# Patient Record
Sex: FEMALE | NOT HISPANIC OR LATINO | Employment: FULL TIME | URBAN - METROPOLITAN AREA
[De-identification: names, ages, dates, MRNs, and addresses within clinical notes are randomized per-mention and may not be internally consistent; named-entity substitution may affect disease eponyms.]

---

## 2019-04-28 ENCOUNTER — HOSPITAL ENCOUNTER (EMERGENCY)
Facility: CLINIC | Age: 24
Discharge: HOME | End: 2019-04-28
Attending: EMERGENCY MEDICINE | Admitting: EMERGENCY MEDICINE

## 2019-04-28 ENCOUNTER — APPOINTMENT (OUTPATIENT)
Dept: ULTRASOUND IMAGING | Facility: CLINIC | Age: 24
End: 2019-04-28
Attending: EMERGENCY MEDICINE

## 2019-04-28 VITALS
WEIGHT: 150 LBS | SYSTOLIC BLOOD PRESSURE: 116 MMHG | HEIGHT: 69 IN | BODY MASS INDEX: 22.22 KG/M2 | TEMPERATURE: 98.7 F | RESPIRATION RATE: 22 BRPM | OXYGEN SATURATION: 100 % | HEART RATE: 76 BPM | DIASTOLIC BLOOD PRESSURE: 78 MMHG

## 2019-04-28 DIAGNOSIS — F43.0 STRESS RESPONSE: ICD-10-CM

## 2019-04-28 DIAGNOSIS — F41.0 ANXIETY ATTACK: ICD-10-CM

## 2019-04-28 LAB
ABO + RH BLD: NORMAL
ABO + RH BLD: NORMAL
ANION GAP SERPL CALCULATED.3IONS-SCNC: 15 MMOL/L (ref 3–14)
B-HCG SERPL-ACNC: <1 IU/L (ref 0–5)
BASOPHILS # BLD AUTO: 0 10E9/L (ref 0–0.2)
BASOPHILS NFR BLD AUTO: 0.4 %
BUN SERPL-MCNC: 24 MG/DL (ref 7–30)
CALCIUM SERPL-MCNC: 9.5 MG/DL (ref 8.5–10.1)
CHLORIDE SERPL-SCNC: 106 MMOL/L (ref 94–109)
CO2 SERPL-SCNC: 18 MMOL/L (ref 20–32)
CREAT SERPL-MCNC: 0.79 MG/DL (ref 0.52–1.04)
DIFFERENTIAL METHOD BLD: NORMAL
EOSINOPHIL # BLD AUTO: 0.1 10E9/L (ref 0–0.7)
EOSINOPHIL NFR BLD AUTO: 0.5 %
ERYTHROCYTE [DISTWIDTH] IN BLOOD BY AUTOMATED COUNT: 12.8 % (ref 10–15)
GFR SERPL CREATININE-BSD FRML MDRD: >90 ML/MIN/{1.73_M2}
GLUCOSE SERPL-MCNC: 97 MG/DL (ref 70–99)
HCT VFR BLD AUTO: 40.7 % (ref 35–47)
HGB BLD-MCNC: 13.8 G/DL (ref 11.7–15.7)
IMM GRANULOCYTES # BLD: 0 10E9/L (ref 0–0.4)
IMM GRANULOCYTES NFR BLD: 0.4 %
LYMPHOCYTES # BLD AUTO: 1.9 10E9/L (ref 0.8–5.3)
LYMPHOCYTES NFR BLD AUTO: 18.8 %
MCH RBC QN AUTO: 29.3 PG (ref 26.5–33)
MCHC RBC AUTO-ENTMCNC: 33.9 G/DL (ref 31.5–36.5)
MCV RBC AUTO: 86 FL (ref 78–100)
MONOCYTES # BLD AUTO: 0.6 10E9/L (ref 0–1.3)
MONOCYTES NFR BLD AUTO: 5.7 %
NEUTROPHILS # BLD AUTO: 7.5 10E9/L (ref 1.6–8.3)
NEUTROPHILS NFR BLD AUTO: 74.2 %
NRBC # BLD AUTO: 0 10*3/UL
NRBC BLD AUTO-RTO: 0 /100
PLATELET # BLD AUTO: 286 10E9/L (ref 150–450)
POTASSIUM SERPL-SCNC: 3.5 MMOL/L (ref 3.4–5.3)
RBC # BLD AUTO: 4.71 10E12/L (ref 3.8–5.2)
SODIUM SERPL-SCNC: 139 MMOL/L (ref 133–144)
SPECIMEN EXP DATE BLD: NORMAL
TROPONIN I SERPL-MCNC: <0.015 UG/L (ref 0–0.04)
WBC # BLD AUTO: 10.1 10E9/L (ref 4–11)

## 2019-04-28 PROCEDURE — 76801 OB US < 14 WKS SINGLE FETUS: CPT

## 2019-04-28 PROCEDURE — 80048 BASIC METABOLIC PNL TOTAL CA: CPT | Performed by: EMERGENCY MEDICINE

## 2019-04-28 PROCEDURE — 84702 CHORIONIC GONADOTROPIN TEST: CPT | Performed by: EMERGENCY MEDICINE

## 2019-04-28 PROCEDURE — 99284 EMERGENCY DEPT VISIT MOD MDM: CPT | Mod: 25

## 2019-04-28 PROCEDURE — 25000128 H RX IP 250 OP 636: Performed by: EMERGENCY MEDICINE

## 2019-04-28 PROCEDURE — 84484 ASSAY OF TROPONIN QUANT: CPT | Performed by: EMERGENCY MEDICINE

## 2019-04-28 PROCEDURE — 86901 BLOOD TYPING SEROLOGIC RH(D): CPT | Performed by: EMERGENCY MEDICINE

## 2019-04-28 PROCEDURE — 85025 COMPLETE CBC W/AUTO DIFF WBC: CPT | Performed by: EMERGENCY MEDICINE

## 2019-04-28 RX ORDER — LORAZEPAM 2 MG/ML
1 INJECTION INTRAMUSCULAR ONCE
Status: DISCONTINUED | OUTPATIENT
Start: 2019-04-28 | End: 2019-04-28 | Stop reason: CLARIF

## 2019-04-28 RX ORDER — ALPRAZOLAM 0.5 MG
0.5 TABLET ORAL 3 TIMES DAILY PRN
Qty: 8 TABLET | Refills: 0 | Status: SHIPPED | OUTPATIENT
Start: 2019-04-28 | End: 2024-06-18

## 2019-04-28 ASSESSMENT — MIFFLIN-ST. JEOR: SCORE: 1509.78

## 2019-04-28 NOTE — ED AVS SNAPSHOT
Emergency Department  [address] [place] [id number]  Phone:  [phone]  Fax:  [phone]                                    Pelon Guevara   MRN: [id number]    Department:   Emergency Department   Date of Visit:  4/28/2019           After Visit Summary Signature Page    I have received my discharge instructions, and my questions have been answered. I have discussed any challenges I see with this plan with the nurse or doctor.    ..........................................................................................................................................  Patient/Patient Representative Signature      ..........................................................................................................................................  Patient Representative Print Name and Relationship to Patient    ..................................................               ................................................  Date                                   Time    ..........................................................................................................................................  Reviewed by Signature/Title    ...................................................              ..............................................  Date                                               Time          22EPIC Rev 08/18

## 2019-04-29 NOTE — ED NOTES
Pt reports being approx. 8 weeks pregnant at this time. States that she noted some vaginal bleeding yesterday.
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
## 2019-04-29 NOTE — ED TRIAGE NOTES
Pt presents to the ED with c/o anxiety and panic attack. Per patient and EMS patient had a similar episode earlier this week. Pt breathing rapidly upon arrival, complains of some chest tightness.

## 2019-04-29 NOTE — ED NOTES
Bed: ED13  Expected date:   Expected time:   Means of arrival:   Comments:  Estefany 23/F Panic attack

## 2020-08-22 ENCOUNTER — NURSE TRIAGE (OUTPATIENT)
Dept: NURSING | Facility: CLINIC | Age: 25
End: 2020-08-22
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
## 2024-03-28 ENCOUNTER — APPOINTMENT (OUTPATIENT)
Dept: GENERAL RADIOLOGY | Facility: CLINIC | Age: 29
End: 2024-03-28
Attending: FAMILY MEDICINE

## 2024-03-28 ENCOUNTER — APPOINTMENT (OUTPATIENT)
Dept: CT IMAGING | Facility: CLINIC | Age: 29
End: 2024-03-28
Attending: FAMILY MEDICINE

## 2024-03-28 ENCOUNTER — HOSPITAL ENCOUNTER (EMERGENCY)
Facility: CLINIC | Age: 29
Discharge: HOME | End: 2024-03-28
Attending: FAMILY MEDICINE | Admitting: FAMILY MEDICINE

## 2024-03-28 VITALS
RESPIRATION RATE: 14 BRPM | SYSTOLIC BLOOD PRESSURE: 111 MMHG | OXYGEN SATURATION: 100 % | BODY MASS INDEX: 25.76 KG/M2 | WEIGHT: 173.9 LBS | HEART RATE: 78 BPM | DIASTOLIC BLOOD PRESSURE: 70 MMHG | TEMPERATURE: 98.5 F | HEIGHT: 69 IN

## 2024-03-28 DIAGNOSIS — S99.911A ANKLE INJURY, RIGHT, INITIAL ENCOUNTER: ICD-10-CM

## 2024-03-28 DIAGNOSIS — S89.92XA KNEE INJURY, LEFT, INITIAL ENCOUNTER: ICD-10-CM

## 2024-03-28 DIAGNOSIS — S39.91XA INJURY OF ABDOMEN, INITIAL ENCOUNTER: ICD-10-CM

## 2024-03-28 DIAGNOSIS — S29.9XXA CHEST WALL INJURY, INITIAL ENCOUNTER: ICD-10-CM
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
## 2024-03-28 LAB
ALBUMIN SERPL BCG-MCNC: 4.4 G/DL (ref 3.5–5.2)
ALBUMIN UR-MCNC: NEGATIVE MG/DL
ALP SERPL-CCNC: 51 U/L (ref 40–150)
ALT SERPL W P-5'-P-CCNC: 13 U/L (ref 0–50)
AMYLASE SERPL-CCNC: 65 U/L (ref 28–100)
ANION GAP SERPL CALCULATED.3IONS-SCNC: 8 MMOL/L (ref 7–15)
APPEARANCE UR: CLEAR
APTT PPP: 33 SECONDS (ref 22–38)
AST SERPL W P-5'-P-CCNC: 16 U/L (ref 0–45)
ATRIAL RATE - MUSE: 82 BPM
BACTERIA #/AREA URNS HPF: ABNORMAL /HPF
BASOPHILS # BLD AUTO: 0 10E3/UL (ref 0–0.2)
BASOPHILS NFR BLD AUTO: 1 %
BILIRUB SERPL-MCNC: 0.6 MG/DL
BILIRUB UR QL STRIP: NEGATIVE
BUN SERPL-MCNC: 15 MG/DL (ref 6–20)
CALCIUM SERPL-MCNC: 9.3 MG/DL (ref 8.6–10)
CHLORIDE SERPL-SCNC: 103 MMOL/L (ref 98–107)
COLOR UR AUTO: ABNORMAL
CREAT SERPL-MCNC: 0.69 MG/DL (ref 0.51–0.95)
DEPRECATED HCO3 PLAS-SCNC: 28 MMOL/L (ref 22–29)
DIASTOLIC BLOOD PRESSURE - MUSE: NORMAL MMHG
EGFRCR SERPLBLD CKD-EPI 2021: >90 ML/MIN/1.73M2
EOSINOPHIL # BLD AUTO: 0.2 10E3/UL (ref 0–0.7)
EOSINOPHIL NFR BLD AUTO: 2 %
ERYTHROCYTE [DISTWIDTH] IN BLOOD BY AUTOMATED COUNT: 12.4 % (ref 10–15)
GLUCOSE SERPL-MCNC: 95 MG/DL (ref 70–99)
GLUCOSE UR STRIP-MCNC: NEGATIVE MG/DL
HCG SERPL QL: NEGATIVE
HCG UR QL: NEGATIVE
HCT VFR BLD AUTO: 40.8 % (ref 35–47)
HGB BLD-MCNC: 13.2 G/DL (ref 11.7–15.7)
HGB UR QL STRIP: NEGATIVE
IMM GRANULOCYTES # BLD: 0 10E3/UL
IMM GRANULOCYTES NFR BLD: 0 %
INR PPP: 0.92 (ref 0.85–1.15)
INTERNAL QC OK POCT: NORMAL
INTERPRETATION ECG - MUSE: NORMAL
KETONES UR STRIP-MCNC: NEGATIVE MG/DL
LEUKOCYTE ESTERASE UR QL STRIP: NEGATIVE
LYMPHOCYTES # BLD AUTO: 1.4 10E3/UL (ref 0.8–5.3)
LYMPHOCYTES NFR BLD AUTO: 18 %
MAGNESIUM SERPL-MCNC: 2 MG/DL (ref 1.7–2.3)
MCH RBC QN AUTO: 29.5 PG (ref 26.5–33)
MCHC RBC AUTO-ENTMCNC: 32.4 G/DL (ref 31.5–36.5)
MCV RBC AUTO: 91 FL (ref 78–100)
MONOCYTES # BLD AUTO: 0.4 10E3/UL (ref 0–1.3)
MONOCYTES NFR BLD AUTO: 5 %
MUCOUS THREADS #/AREA URNS LPF: PRESENT /LPF
NEUTROPHILS # BLD AUTO: 5.8 10E3/UL (ref 1.6–8.3)
NEUTROPHILS NFR BLD AUTO: 74 %
NITRATE UR QL: NEGATIVE
NRBC # BLD AUTO: 0 10E3/UL
NRBC BLD AUTO-RTO: 0 /100
P AXIS - MUSE: 72 DEGREES
PH UR STRIP: 6.5 [PH] (ref 5–7)
PLATELET # BLD AUTO: 265 10E3/UL (ref 150–450)
POCT KIT EXPIRATION DATE: NORMAL
POCT KIT LOT NUMBER: NORMAL
POTASSIUM SERPL-SCNC: 4.2 MMOL/L (ref 3.4–5.3)
PR INTERVAL - MUSE: 210 MS
PROT SERPL-MCNC: 7.4 G/DL (ref 6.4–8.3)
QRS DURATION - MUSE: 126 MS
QT - MUSE: 400 MS
QTC - MUSE: 467 MS
R AXIS - MUSE: 97 DEGREES
RBC # BLD AUTO: 4.48 10E6/UL (ref 3.8–5.2)
RBC URINE: 1 /HPF
SODIUM SERPL-SCNC: 139 MMOL/L (ref 135–145)
SP GR UR STRIP: 1.02 (ref 1–1.03)
SQUAMOUS EPITHELIAL: 1 /HPF
SYSTOLIC BLOOD PRESSURE - MUSE: NORMAL MMHG
T AXIS - MUSE: 40 DEGREES
UROBILINOGEN UR STRIP-MCNC: NORMAL MG/DL
VENTRICULAR RATE- MUSE: 82 BPM
WBC # BLD AUTO: 7.8 10E3/UL (ref 4–11)
WBC URINE: 1 /HPF

## 2024-03-28 PROCEDURE — 250N000011 HC RX IP 250 OP 636: Performed by: FAMILY MEDICINE

## 2024-03-28 PROCEDURE — 82150 ASSAY OF AMYLASE: CPT | Performed by: FAMILY MEDICINE

## 2024-03-28 PROCEDURE — 81025 URINE PREGNANCY TEST: CPT | Performed by: FAMILY MEDICINE

## 2024-03-28 PROCEDURE — 80053 COMPREHEN METABOLIC PANEL: CPT | Performed by: FAMILY MEDICINE

## 2024-03-28 PROCEDURE — 73560 X-RAY EXAM OF KNEE 1 OR 2: CPT | Mod: LT

## 2024-03-28 PROCEDURE — 96361 HYDRATE IV INFUSION ADD-ON: CPT | Performed by: FAMILY MEDICINE

## 2024-03-28 PROCEDURE — 250N000013 HC RX MED GY IP 250 OP 250 PS 637: Performed by: FAMILY MEDICINE

## 2024-03-28 PROCEDURE — 93005 ELECTROCARDIOGRAM TRACING: CPT | Performed by: FAMILY MEDICINE

## 2024-03-28 PROCEDURE — 83735 ASSAY OF MAGNESIUM: CPT | Performed by: FAMILY MEDICINE

## 2024-03-28 PROCEDURE — 36415 COLL VENOUS BLD VENIPUNCTURE: CPT | Performed by: FAMILY MEDICINE

## 2024-03-28 PROCEDURE — 250N000009 HC RX 250: Performed by: FAMILY MEDICINE

## 2024-03-28 PROCEDURE — 76705 ECHO EXAM OF ABDOMEN: CPT | Mod: 26 | Performed by: FAMILY MEDICINE

## 2024-03-28 PROCEDURE — 258N000003 HC RX IP 258 OP 636: Performed by: FAMILY MEDICINE

## 2024-03-28 PROCEDURE — 73610 X-RAY EXAM OF ANKLE: CPT | Mod: RT

## 2024-03-28 PROCEDURE — 85610 PROTHROMBIN TIME: CPT | Performed by: FAMILY MEDICINE

## 2024-03-28 PROCEDURE — 71260 CT THORAX DX C+: CPT

## 2024-03-28 PROCEDURE — 85730 THROMBOPLASTIN TIME PARTIAL: CPT | Performed by: FAMILY MEDICINE

## 2024-03-28 PROCEDURE — 99284 EMERGENCY DEPT VISIT MOD MDM: CPT | Mod: 25 | Performed by: FAMILY MEDICINE

## 2024-03-28 PROCEDURE — 93010 ELECTROCARDIOGRAM REPORT: CPT | Performed by: FAMILY MEDICINE

## 2024-03-28 PROCEDURE — 99285 EMERGENCY DEPT VISIT HI MDM: CPT | Mod: 25 | Performed by: FAMILY MEDICINE

## 2024-03-28 PROCEDURE — 76705 ECHO EXAM OF ABDOMEN: CPT | Performed by: FAMILY MEDICINE

## 2024-03-28 PROCEDURE — 85025 COMPLETE CBC W/AUTO DIFF WBC: CPT | Performed by: FAMILY MEDICINE

## 2024-03-28 PROCEDURE — 81001 URINALYSIS AUTO W/SCOPE: CPT | Performed by: FAMILY MEDICINE

## 2024-03-28 PROCEDURE — 84703 CHORIONIC GONADOTROPIN ASSAY: CPT | Performed by: FAMILY MEDICINE

## 2024-03-28 PROCEDURE — 96360 HYDRATION IV INFUSION INIT: CPT | Mod: 59 | Performed by: FAMILY MEDICINE

## 2024-03-28 RX ORDER — IBUPROFEN 200 MG
400 TABLET ORAL ONCE
Status: COMPLETED | OUTPATIENT
Start: 2024-03-28 | End: 2024-03-28

## 2024-03-28 RX ORDER — IOPAMIDOL 755 MG/ML
100 INJECTION, SOLUTION INTRAVASCULAR ONCE
Status: COMPLETED | OUTPATIENT
Start: 2024-03-28 | End: 2024-03-28

## 2024-03-28 RX ORDER — LIDOCAINE 40 MG/G
CREAM TOPICAL
Status: DISCONTINUED | OUTPATIENT
Start: 2024-03-28 | End: 2024-03-28 | Stop reason: HOSPADM

## 2024-03-28 RX ADMIN — IOPAMIDOL 84 ML: 755 INJECTION, SOLUTION INTRAVENOUS at 09:38

## 2024-03-28 RX ADMIN — SODIUM CHLORIDE 42 ML: 9 INJECTION, SOLUTION INTRAVENOUS at 09:39

## 2024-03-28 RX ADMIN — SODIUM CHLORIDE 1000 ML: 9 INJECTION, SOLUTION INTRAVENOUS at 08:33

## 2024-03-28 RX ADMIN — IBUPROFEN 400 MG: 200 TABLET, FILM COATED ORAL at 10:55

## 2024-03-28 ASSESSMENT — ACTIVITIES OF DAILY LIVING (ADL)
ADLS_ACUITY_SCORE: 35

## 2024-03-28 ASSESSMENT — COLUMBIA-SUICIDE SEVERITY RATING SCALE - C-SSRS
6. HAVE YOU EVER DONE ANYTHING, STARTED TO DO ANYTHING, OR PREPARED TO DO ANYTHING TO END YOUR LIFE?: NO
1. IN THE PAST MONTH, HAVE YOU WISHED YOU WERE DEAD OR WISHED YOU COULD GO TO SLEEP AND NOT WAKE UP?: NO
2. HAVE YOU ACTUALLY HAD ANY THOUGHTS OF KILLING YOURSELF IN THE PAST MONTH?: NO
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
## 2024-05-05 ENCOUNTER — HEALTH MAINTENANCE LETTER (OUTPATIENT)
Age: 29
End: 2024-05-05

## 2024-06-02 ENCOUNTER — NURSE TRIAGE (OUTPATIENT)
Dept: NURSING | Facility: CLINIC | Age: 29
End: 2024-06-02

## 2024-06-02 ENCOUNTER — APPOINTMENT (OUTPATIENT)
Dept: CT IMAGING | Facility: CLINIC | Age: 29
End: 2024-06-02
Attending: EMERGENCY MEDICINE
Payer: OTHER MISCELLANEOUS

## 2024-06-02 ENCOUNTER — HOSPITAL ENCOUNTER (EMERGENCY)
Facility: CLINIC | Age: 29
Discharge: HOME | End: 2024-06-02
Attending: EMERGENCY MEDICINE | Admitting: EMERGENCY MEDICINE
Payer: OTHER MISCELLANEOUS

## 2024-06-02 VITALS
DIASTOLIC BLOOD PRESSURE: 72 MMHG | SYSTOLIC BLOOD PRESSURE: 110 MMHG | HEART RATE: 71 BPM | OXYGEN SATURATION: 99 % | RESPIRATION RATE: 13 BRPM | TEMPERATURE: 98.4 F

## 2024-06-02 DIAGNOSIS — F07.81 POST CONCUSSIVE SYNDROME: ICD-10-CM

## 2024-06-02 PROCEDURE — 99284 EMERGENCY DEPT VISIT MOD MDM: CPT | Performed by: EMERGENCY MEDICINE

## 2024-06-02 PROCEDURE — 250N000011 HC RX IP 250 OP 636: Performed by: EMERGENCY MEDICINE

## 2024-06-02 PROCEDURE — 70450 CT HEAD/BRAIN W/O DYE: CPT

## 2024-06-02 PROCEDURE — 72125 CT NECK SPINE W/O DYE: CPT

## 2024-06-02 PROCEDURE — 99284 EMERGENCY DEPT VISIT MOD MDM: CPT | Mod: 25 | Performed by: EMERGENCY MEDICINE

## 2024-06-02 RX ORDER — ONDANSETRON 4 MG/1
4 TABLET, ORALLY DISINTEGRATING ORAL EVERY 6 HOURS PRN
Qty: 10 TABLET | Refills: 0 | Status: SHIPPED | OUTPATIENT
Start: 2024-06-02 | End: 2024-06-05

## 2024-06-02 RX ORDER — ONDANSETRON 4 MG/1
4 TABLET, ORALLY DISINTEGRATING ORAL ONCE
Status: COMPLETED | OUTPATIENT
Start: 2024-06-02 | End: 2024-06-02

## 2024-06-02 RX ADMIN — ONDANSETRON 4 MG: 4 TABLET, ORALLY DISINTEGRATING ORAL at 15:05

## 2024-06-02 ASSESSMENT — COLUMBIA-SUICIDE SEVERITY RATING SCALE - C-SSRS
1. IN THE PAST MONTH, HAVE YOU WISHED YOU WERE DEAD OR WISHED YOU COULD GO TO SLEEP AND NOT WAKE UP?: NO
6. HAVE YOU EVER DONE ANYTHING, STARTED TO DO ANYTHING, OR PREPARED TO DO ANYTHING TO END YOUR LIFE?: NO
2. HAVE YOU ACTUALLY HAD ANY THOUGHTS OF KILLING YOURSELF IN THE PAST MONTH?: NO

## 2024-06-02 ASSESSMENT — ACTIVITIES OF DAILY LIVING (ADL)
ADLS_ACUITY_SCORE: 35

## 2024-06-02 NOTE — ED TRIAGE NOTES
Was leaving work yesterday, was grabbing her things out of her locker and hit her head on the locker door above. Did not fall or hit her head. Denies LOC. Neck tenderness. Unable to look at computer screens due to sensitivity. Reports forgetfulness.      Triage Assessment (Adult)       Row Name 06/02/24 1437          Triage Assessment    Airway WDL WDL        Respiratory WDL    Respiratory WDL WDL        Skin Circulation/Temperature WDL    Skin Circulation/Temperature WDL WDL        Cardiac WDL    Cardiac WDL WDL        Peripheral/Neurovascular WDL    Peripheral Neurovascular WDL WDL        Cognitive/Neuro/Behavioral WDL    Cognitive/Neuro/Behavioral WDL WDL

## 2024-06-02 NOTE — DISCHARGE INSTRUCTIONS
Avoid anything that stresses your eyes or mind over the next week.    A referral has been placed into the computer system for you to be seen by the concussion clinic.  They should call you in the next 48 hours to help you set up an appointment to be seen sometime in the next week.

## 2024-06-02 NOTE — ED PROVIDER NOTES
ED Provider Note  [facility]      History     Chief Complaint   Patient presents with    Head Injury     The history is provided by the patient and medical records.     Pelon Guevara is a 29 year old female employee of [employer] who was at her locker yesterday morning when someone had the locker door above her open.  She was bending over into her locker and stood up and hit the left parietal aspect of her scalp against the open locker door above her locker.  The patient had no immediate loss of consciousness but did have some dizziness, near syncope, and nauseousness.  The patient went home and when getting home fell asleep immediately only to wake up a few hours later with a significant left-sided headache.  The patient states that she has also developed some numbness in her left shoulder area.  The patient has had persistent nausea since the incident. No vomiting, no diarrhea, and complains of some slight neck pain as well.  The patient feels slightly confused.    This part of the medical record was transcribed by Donte Gaines Scribe, from a dictation done by Cesar Martell MD.     Past Medical History  No past medical history on file.    No past surgical history on file.    ALPRAZolam (XANAX) 0.5 MG tablet      Allergies   Allergen Reactions    Acetaminophen      Other Reaction(s): Throat Irritation, Throat Swelling/Closing    Naproxen Other (See Comments)     Other Reaction(s): Throat Irritation    Able to take ibuprofen without issues    Able to tolerate ibuprofen.     Family History  No family history on file.    Social History   Social History     Tobacco Use    Smoking status: Never    Smokeless tobacco: Never   Substance Use Topics    Alcohol use: Yes    Drug use: Never      A medically appropriate review of systems was performed with pertinent positives and negatives noted in the HPI, and all other systems negative.    Physical Exam   BP: 114/59  Pulse:  77  Temp: 97.6  F (36.4  C)  Resp: 16  SpO2: 98 %  Physical Exam  Vitals and nursing note reviewed.   Constitutional:       Comments: Conversant pleasant complaining of nausea   HENT:      Head: Atraumatic.   Eyes:      Extraocular Movements: Extraocular movements intact.      Pupils: Pupils are equal, round, and reactive to light.   Cardiovascular:      Rate and Rhythm: Regular rhythm.   Pulmonary:      Breath sounds: Normal breath sounds.   Abdominal:      Palpations: Abdomen is soft.   Musculoskeletal:         General: No deformity.      Cervical back: Neck supple.   Neurological:      General: No focal deficit present.      Mental Status: She is alert and oriented to person, place, and time.      Motor: No weakness.      Comments: Patient had some trouble with serial sevens   Psychiatric:         Mood and Affect: Mood normal.           ED Course, Procedures, & Data      Orders Placed This Encounter   Procedures    Head CT w/o contrast    CT Cervical Spine w/o Contrast    Concussion  Referral       Procedures        Results for orders placed or performed during the hospital encounter of 06/02/24   Head CT w/o contrast     Status: None    Narrative    EXAM: CT HEAD W/O CONTRAST, CT CERVICAL SPINE W/O CONTRAST  LOCATION: [facility]  DATE: 6/2/2024    INDICATION: Trauma, headache, neck pain  COMPARISON: None.  TECHNIQUE:   1) Routine CT Head without IV contrast. Multiplanar reformats. Dose reduction techniques were used.  2) Routine CT Cervical Spine without IV contrast. Multiplanar reformats. Dose reduction techniques were used.    FINDINGS:   HEAD CT:   INTRACRANIAL CONTENTS: No intracranial hemorrhage, extraaxial collection, or mass effect.  No CT evidence of acute infarct. Normal parenchymal attenuation. Normal ventricles and sulci.     VISUALIZED ORBITS/SINUSES/MASTOIDS: No intraorbital abnormality. No paranasal sinus mucosal disease. No middle ear or  mastoid effusion.    BONES/SOFT TISSUES: No acute abnormality.    CERVICAL SPINE CT:   VERTEBRA: Normal vertebral body heights. No fracture or posttraumatic subluxation.     CANAL/FORAMINA: No high-grade spinal canal stenosis.    PARASPINAL: No extraspinal abnormality. Visualized lung fields are clear.      Impression    IMPRESSION:  HEAD CT:  1.  No acute intracranial process.    CERVICAL SPINE CT:  1.  No acute fracture.   CT Cervical Spine w/o Contrast     Status: None    Narrative    EXAM: CT HEAD W/O CONTRAST, CT CERVICAL SPINE W/O CONTRAST  LOCATION: [facility]  DATE: 6/2/2024    INDICATION: Trauma, headache, neck pain  COMPARISON: None.  TECHNIQUE:   1) Routine CT Head without IV contrast. Multiplanar reformats. Dose reduction techniques were used.  2) Routine CT Cervical Spine without IV contrast. Multiplanar reformats. Dose reduction techniques were used.    FINDINGS:   HEAD CT:   INTRACRANIAL CONTENTS: No intracranial hemorrhage, extraaxial collection, or mass effect.  No CT evidence of acute infarct. Normal parenchymal attenuation. Normal ventricles and sulci.     VISUALIZED ORBITS/SINUSES/MASTOIDS: No intraorbital abnormality. No paranasal sinus mucosal disease. No middle ear or mastoid effusion.    BONES/SOFT TISSUES: No acute abnormality.    CERVICAL SPINE CT:   VERTEBRA: Normal vertebral body heights. No fracture or posttraumatic subluxation.     CANAL/FORAMINA: No high-grade spinal canal stenosis.    PARASPINAL: No extraspinal abnormality. Visualized lung fields are clear.      Impression    IMPRESSION:  HEAD CT:  1.  No acute intracranial process.    CERVICAL SPINE CT:  1.  No acute fracture.     Medications   ondansetron (ZOFRAN ODT) ODT tab 4 mg (4 mg Oral $Given 6/2/24 8945)       Critical care was not performed.     Medical Decision Making  The patient's presentation was of moderate complexity (an acute illness with systemic symptoms).    The  patient's evaluation involved:  ordering and/or review of 3+ test(s) in this encounter (see above)    The patient's management necessitated moderate risk (prescription drug management including medications given in the ED).    Assessment & Plan      I have reviewed the nursing notes.     With the patient has difficulty with serial sevens and her ongoing nausea, patient has by definition i.e. postconcussive syndrome and will be treated with the medications below and will be referred on to the concussion clinic.  Patient will be given a week off work.    I have reviewed the findings, diagnosis, plan and need for follow up with the patient.    New Prescriptions    ONDANSETRON (ZOFRAN ODT) 4 MG ODT TAB    Take 1 tablet (4 mg) by mouth every 6 hours as needed for nausea or vomiting       Final diagnoses:   Post concussive syndrome     Avoid anything that stresses your eyes or mind over the next week.  Work note given to have 1 week off.    A referral has been placed into the computer system for you to be seen by the concussion clinic.  They should call you in the next 48 hours to help you set up an appointment to be seen sometime in the next week.    Routine discharge instructions were given for this diagnosis    Cesar Martell Md  [facility] EMERGENCY DEPARTMENT  6/2/2024     Cesar Martell MD  06/02/24 [id number]

## 2024-06-02 NOTE — Clinical Note
Pelon Guevara was seen and treated in our emergency department on 6/2/2024.  She may return to work on 06/10/2024.       If you have any questions or concerns, please don't hesitate to call.      Cesar Martell MD

## 2024-06-02 NOTE — TELEPHONE ENCOUNTER
Triage call  Patient calling she was at work yesterday and she fell into some lockers and hit her head she is having a headache and  some tingling on her head  she also has a bruise and it is tender to the to touch.  She thinks she may have had a concussion.  She is having a hard time focusing on the computer at work.    Per protocol go to the ED now( or PCP Triage)  Care advice given.  Verbalizes understanding and agrees with plan.    Nelida Whyte RN   [facility] Nurse Advisor  2:10 PM 6/2/2024    Reason for Disposition   [1] ACUTE NEURO SYMPTOM AND [2] now fine  (DEFINITION: difficult to awaken OR confused thinking and talking OR slurred speech OR weakness of arms OR unsteady walking)    Additional Information   Negative: [1] ACUTE NEURO SYMPTOM AND [2] present now  (DEFINITION: difficult to awaken OR confused thinking and talking OR slurred speech OR weakness of arms OR unsteady walking)   Negative: Knocked out (unconscious) > 1 minute   Negative: Seizure (convulsion) occurred  (Exception: Prior history of seizures and now alert and without Acute Neuro Symptoms.)   Negative: Penetrating head injury (e.g., knife, gun shot wound, metal object)   Negative: [1] Major bleeding (e.g., actively dripping or spurting) AND [2] can't be stopped   Negative: [1] Dangerous mechanism of injury (e.g., MVA, diving, trampoline, contact sports, fall > 10 feet or 3 meters) AND [2] NECK pain AND [3] began < 1 hour after injury   Negative: Sounds like a life-threatening emergency to the triager   Negative: [1] Diagnosed with concussion AND [2] within last 14 days   Negative: [1] Traumatic brain injury (mTBI; concussion) AND [2] more than 14 days since head injury   Negative: Can't remember what happened (amnesia)   Negative: Vomiting once or more   Negative: [1] Loss of vision or double vision AND [2] present now   Negative: Watery or blood-tinged fluid dripping from the NOSE or EARS now  (Exception: Tears from crying or  "nosebleed from nasal trauma.)   Negative: [1] One or two \"black eyes\" (bruising, purple color of eyelids) AND [2] onset within 24 hours of head injury   Negative: Large swelling or bruise > 2 inches (5 cm)   Negative: Skin is split open or gaping  (or length > 1/2 inch or 12 mm)   Negative: [1] Bleeding AND [2] won't stop after 10 minutes of direct pressure (using correct technique)   Negative: Sounds like a serious injury to the triager    Protocols used: Head Injury-A-AH    "

## 2024-06-18 ENCOUNTER — OFFICE VISIT (OUTPATIENT)
Dept: PHYSICAL MEDICINE AND REHAB | Facility: CLINIC | Age: 29
End: 2024-06-18
Payer: OTHER MISCELLANEOUS

## 2024-06-18 VITALS — DIASTOLIC BLOOD PRESSURE: 63 MMHG | SYSTOLIC BLOOD PRESSURE: 107 MMHG | HEART RATE: 83 BPM

## 2024-06-18 DIAGNOSIS — S06.0XAA CONCUSSION WITH UNKNOWN LOSS OF CONSCIOUSNESS STATUS, INITIAL ENCOUNTER: Primary | ICD-10-CM

## 2024-06-18 PROCEDURE — 99205 OFFICE O/P NEW HI 60 MIN: CPT | Performed by: PHYSICAL MEDICINE & REHABILITATION

## 2024-06-18 RX ORDER — ONDANSETRON 8 MG/1
8 TABLET, FILM COATED ORAL EVERY 8 HOURS PRN
COMMUNITY

## 2024-06-18 NOTE — LETTER
June 18, 2024      Pelon Guevara  [address]  [place] [address]        To Whom It May Concern:    Pelon Guevara  was seen on 6/18/24 . Please excuse her from work for a maximum of 4 weeks pending occupational medicine evaluation. Future return to work guide will be determined by occupational medicine.         Sincerely,        Sharon Singletary MD

## 2024-06-18 NOTE — NURSING NOTE
Chief Complaint   Patient presents with    Consult For     Work related injury on 6/2  Went back to work last Tuesday and has been needing to leave early due to symptoms and environment   [employer]

## 2024-06-18 NOTE — LETTER
2024      Pelon Guevara  [address]  [place] [address]      Dear Colleague,    Thank you for referring your patient, Pelon Guevara, to the [facility]. Please see a copy of my visit note below.    .       PM&R Clinic Note     Patient Name: Pelon Guevara : 1995 Medical Record: [id number]     Requesting Physician/clinician: No att. providers found           History of Present Illness:     Pelon Guevara is a 29 year old female referred for concussion evaluation.    Per ED notes 24: Pelon Guevara is a 29 year old female employee of [employer] who was at her locker yesterday morning when someone had the locker door above her open.  She was bending over into her locker and stood up and hit the left parietal aspect of her scalp against the open locker door above her locker.  The patient had no immediate loss of consciousness but did have some dizziness, near syncope, and nauseousness.  The patient went home and when getting home fell asleep immediately only to wake up a few hours later with a significant left-sided headache.  The patient states that she has also developed some numbness in her left shoulder area.  The patient has had persistent nausea since the incident. No vomiting, no diarrhea, and complains of some slight neck pain as well.  The patient feels slightly confused.       Today, patient reports the following:    HA: started since the event. More in the early morning, bi-frontal, radiates to the back and left side of the neck, worse with physical activity and better with rest.  Associated with dizziness, light sensitivity, nausea and poor sleep quality.    Limited screen time to 20 min. Reports being more emotional.    Functionally, independent with ADLs and iADLs    Reports first concussion ever. Back to work since 6/10 but had to leave earlier.           Past Medical and Surgical  "History:     No past medical history on file.  No past surgical history on file.         Social History:     Social History     Tobacco Use     Smoking status: Never     Smokeless tobacco: Never   Substance Use Topics     Alcohol use: Yes              Functional history:     ADLs: as above  iADLs (medication management and finances): as above         Family History:     No family history on file.         Medications:     Current Outpatient Medications   Medication Sig Dispense Refill     ondansetron (ZOFRAN) 8 MG tablet Take 8 mg by mouth every 8 hours as needed for nausea              Allergies:     Allergies   Allergen Reactions     Acetaminophen      Other Reaction(s): Throat Irritation, Throat Swelling/Closing     Naproxen Other (See Comments)     Other Reaction(s): Throat Irritation    Able to take ibuprofen without issues    Able to tolerate ibuprofen.              ROS:     A focused ROS is negative other than the symptoms noted above in the HPI.         Physical Examiniation:     VITAL SIGNS: /63 (BP Location: Left arm, Patient Position: Sitting, Cuff Size: Adult Regular)   Pulse 83   LMP 05/27/2024 (Approximate)   BMI: Estimated body mass index is 25.68 kg/m  as calculated from the following:    Height as of 3/28/24: 1.753 m (5' 9\").    Weight as of 3/28/24: 78.9 kg (173 lb 14.4 oz).    Gen: NAD, pleasant and cooperative   Neuro/MSK:   Reports discomfort with saccadic and smooth pursuit. Otherwise normal CN exam  Normal strength an sensory testing in bilateral UE & LE  No UMN signs identified         Laboratory/Imaging:      CT Cervical Spine w/o Contrast Final result 6/2/2024          Narrative   EXAM: CT HEAD W/O CONTRAST, CT CERVICAL SPINE W/O CONTRAST  LOCATION: [facility]  DATE: 6/2/2024    INDICATION: Trauma, headache, neck pain  COMPARISON: None.  TECHNIQUE:  1) Routine CT Head without IV contrast. Multiplanar reformats. Dose reduction techniques " were used.  2) Routine CT Cervical Spine without IV contrast. Multiplanar reformats. Dose reduction techniques were used.   ...   Impression   IMPRESSION:  HEAD CT:  1.  No acute intracranial process.    CERVICAL SPINE CT:  1.  No acute fracture.                 Assessment/Plan:     .(S06.0XAA) Concussion with unknown loss of consciousness status, initial encounter  (primary encounter diagnosis)    Patient education: In depth discussion and education was provided about the assessment and implications of each of the below recommendations for management. Patient indicated readiness to learn, all questions were answered and understanding of material presented was confirmed.    Work-up: none needed at this time    Therapy/equipment/braces: PT for dizziness, vision therapy    Medications: Tylenol for HA    Referral / follow up with other providers: patient will follow with her personal psychologist for mood issues. I referred her to occupational medicine for return to work guide.     Follow up: 3 months. A letter to be off work pending occupational medicine evaluation was provided.    Sharon Singletary MD  Physical Medicine & Rehabilitation    60 minutes spent on the date of the encounter reviewing EPIC notes including ED/UC notes, therapy notes, family care notes, care-everywhere, labs and history and exam documentation. I personally reviewed the image and further activities as noted above.            Again, thank you for allowing me to participate in the care of your patient.        Sincerely,        Sharon Singletary MD

## 2024-06-18 NOTE — PROGRESS NOTES
.       PM&R Clinic Note     Patient Name: Pelon Guevara : 1995 Medical Record: [id number]     Requesting Physician/clinician: No att. providers found           History of Present Illness:     Pelon Guevara is a 29 year old female referred for concussion evaluation.    Per ED notes 24: Pelon Guevara is a 29 year old female employee of [employer] who was at her locker yesterday morning when someone had the locker door above her open.  She was bending over into her locker and stood up and hit the left parietal aspect of her scalp against the open locker door above her locker.  The patient had no immediate loss of consciousness but did have some dizziness, near syncope, and nauseousness.  The patient went home and when getting home fell asleep immediately only to wake up a few hours later with a significant left-sided headache.  The patient states that she has also developed some numbness in her left shoulder area.  The patient has had persistent nausea since the incident. No vomiting, no diarrhea, and complains of some slight neck pain as well.  The patient feels slightly confused.       Today, patient reports the following:    HA: started since the event. More in the early morning, bi-frontal, radiates to the back and left side of the neck, worse with physical activity and better with rest.  Associated with dizziness, light sensitivity, nausea and poor sleep quality.    Limited screen time to 20 min. Reports being more emotional.    Functionally, independent with ADLs and iADLs    Reports first concussion ever. Back to work since 6/10 but had to leave earlier.           Past Medical and Surgical History:     No past medical history on file.  No past surgical history on file.         Social History:     Social History     Tobacco Use    Smoking status: Never    Smokeless tobacco: Never   Substance Use Topics    Alcohol use: Yes              Functional history:     ADLs:  "as above  iADLs (medication management and finances): as above         Family History:     No family history on file.         Medications:     Current Outpatient Medications   Medication Sig Dispense Refill    ondansetron (ZOFRAN) 8 MG tablet Take 8 mg by mouth every 8 hours as needed for nausea              Allergies:     Allergies   Allergen Reactions    Acetaminophen      Other Reaction(s): Throat Irritation, Throat Swelling/Closing    Naproxen Other (See Comments)     Other Reaction(s): Throat Irritation    Able to take ibuprofen without issues    Able to tolerate ibuprofen.              ROS:     A focused ROS is negative other than the symptoms noted above in the HPI.         Physical Examiniation:     VITAL SIGNS: /63 (BP Location: Left arm, Patient Position: Sitting, Cuff Size: Adult Regular)   Pulse 83   LMP 05/27/2024 (Approximate)   BMI: Estimated body mass index is 25.68 kg/m  as calculated from the following:    Height as of 3/28/24: 1.753 m (5' 9\").    Weight as of 3/28/24: 78.9 kg (173 lb 14.4 oz).    Gen: NAD, pleasant and cooperative   Neuro/MSK:   Reports discomfort with saccadic and smooth pursuit. Otherwise normal CN exam  Normal strength an sensory testing in bilateral UE & LE  No UMN signs identified         Laboratory/Imaging:      CT Cervical Spine w/o Contrast Final result 6/2/2024          Narrative   EXAM: CT HEAD W/O CONTRAST, CT CERVICAL SPINE W/O CONTRAST  LOC[facility]  DATE: 6/2/2024    INDICATION: Trauma, headache, neck pain  COMPARISON: None.  TECHNIQUE:  1) Routine CT Head without IV contrast. Multiplanar reformats. Dose reduction techniques were used.  2) Routine CT Cervical Spine without IV contrast. Multiplanar reformats. Dose reduction techniques were used.   ...   Impression   IMPRESSION:  HEAD CT:  1.  No acute intracranial process.    CERVICAL SPINE CT:  1.  No acute fracture.                 Assessment/Plan: "     .(S06.0XAA) Concussion with unknown loss of consciousness status, initial encounter  (primary encounter diagnosis)    Patient education: In depth discussion and education was provided about the assessment and implications of each of the below recommendations for management. Patient indicated readiness to learn, all questions were answered and understanding of material presented was confirmed.    Work-up: none needed at this time    Therapy/equipment/braces: PT for dizziness, vision therapy    Medications: Tylenol for HA    Referral / follow up with other providers: patient will follow with her personal psychologist for mood issues. I referred her to occupational medicine for return to work guide.     Follow up: 3 months. A letter to be off work pending occupational medicine evaluation was provided.    Sharon Singletary MD  Physical Medicine & Rehabilitation    60 minutes spent on the date of the encounter reviewing EPIC notes including ED/UC notes, therapy notes, family care notes, care-everywhere, labs and history and exam documentation. I personally reviewed the image and further activities as noted above.

## 2024-06-24 ENCOUNTER — THERAPY VISIT (OUTPATIENT)
Dept: OCCUPATIONAL THERAPY | Facility: CLINIC | Age: 29
End: 2024-06-24
Attending: PHYSICAL MEDICINE & REHABILITATION
Payer: OTHER MISCELLANEOUS

## 2024-06-24 DIAGNOSIS — S06.0XAA CONCUSSION WITH UNKNOWN LOSS OF CONSCIOUSNESS STATUS, INITIAL ENCOUNTER: ICD-10-CM

## 2024-06-24 DIAGNOSIS — Z78.9 ALTERATION IN INSTRUMENTAL ACTIVITIES OF DAILY LIVING (IADL): Primary | ICD-10-CM

## 2024-06-24 PROCEDURE — 97166 OT EVAL MOD COMPLEX 45 MIN: CPT | Mod: GO | Performed by: OCCUPATIONAL THERAPIST

## 2024-06-24 PROCEDURE — 97535 SELF CARE MNGMENT TRAINING: CPT | Mod: GO | Performed by: OCCUPATIONAL THERAPIST

## 2024-06-24 NOTE — PROGRESS NOTES
OCCUPATIONAL THERAPY EVALUATION  Type of Visit: Evaluation              Subjective        Pelon Guevara is a 29 y.o. female with referral for OP occupational therapy from Dr. Singletary.     Per chart:   Per ED notes 6/2/24: Pelon Guevara is a 29 year old female employee of [employer] who was at her locker yesterday morning when someone had the locker door above her open.  She was bending over into her locker and stood up and hit the left parietal aspect of her scalp against the open locker door above her locker.  The patient had no immediate loss of consciousness but did have some dizziness, near syncope, and nauseousness.  The patient went home and when getting home fell asleep immediately only to wake up a few hours later with a significant left-sided headache.  The patient states that she has also developed some numbness in her left shoulder area.  The patient has had persistent nausea since the incident. No vomiting, no diarrhea, and complains of some slight neck pain as well.  The patient feels slightly confused.     Pt works in [employer] for [employer], off of work currently. Pt with reports of sensitivity to light, sensitivity to noise, increased mood symptoms of anxiety, depression, irritability. Evaluation completed as ordered.     Presenting condition or subjective complaint: concussion  Date of onset: 06/18/24    Relevant medical history:     Dates & types of surgery:      Prior diagnostic imaging/testing results: CT scan     Prior therapy history for the same diagnosis, illness or injury:        Prior Level of Function  Transfers: Independent  Ambulation: Independent  ADL: Independent  IADL: Driving, Finances, Housekeeping, Laundry, Meal preparation, Medication management, Work    Living Environment  Social support: Alone   Type of home: Apartment/condo   Stairs to enter the home:         Ramp: No   Stairs inside the home: Yes 12 Is there a railing: No     Help at home:  None  Equipment owned:       Employment: Yes sterile processing  Hobbies/Interests: hiking gymming running cooking listening to music    Patient goals for therapy: be less sad    Pain assessment: Pain denied     Objective   Vision Interview    Technology Use/Symptoms    Glasses For distance - when driving at night   Light Sensitivity/Glare Indoor, Outdoor   Impaired Vision Blurred vision   Reading Has not been reading since concussion        Reading Endurance/Fatigue    Visual Fatigue Comments    Convergence TBA   Pursuits TBA   Pupillary Function TBA        Difficulty with IADL Performance: Symptom Increase    Difficulty Concentrating at School, Work or Home yes   Difficulty Multitasking/Planning yes   Busy/Dynamic Environments yes   Path Finding in Busy Environments yes   Sensory Tolerance    Startles Easily         Mood Changes    Is Patient Experiencing Changes in Mood? Anxiety, Depression, Feeling irritable   Is Patient Currently Receiving Treatment for Mental Health? Yes started just before onset of concussion        Vestibular Symptoms    Is Patient Experiencing Vestibular Symptoms? Yes   Triggers for Vestibular Symptoms With head movement        Fatigue    General Fatigue Feeling ok        Cognitive Status Examination  Orientation: Oriented to person, place and time   Level of Consciousness: Alert  Follows Commands and Answers Questions: 100% of the time  Personal Safety and Judgement: Intact  Memory: Impaired, forgetfulness  Attention: No deficits identified  Organization/Problem Solving: No deficits identified  Executive Function: No deficits identified, TBA    VISUAL SKILLS  Visual Acuity: No deficits identified  Visual Field: Appears normal  Visual Attention: Appears normal  Oculomotor: TBA    SENSATION: UE Sensation WNL    POSTURE: WNL  RANGE OF MOTION: UE AROM WNL  STRENGTH: UE Strength WNL  MUSCLE TONE: WNL  COORDINATION: WNL  BALANCE: WFL    FUNCTIONAL MOBILITY  Assistive Device(s): None  Ambulation:  "independent  Wheelchair: n/a    ADL/IADL: independent    ACTIVITY TOLERANCE: Pt reports changes with sleep, which have been having an effect on tolerance for activity during the day time. Pt continues to exercise, tolerating walking and running but has avoided resistance exercise. Pt has avoided reading.     INSTRUMENTAL ACTIVITIES OF DAILY LIVING (IADL):   Meal Planning/Prep: independent  Home/Financial Management: independent  Communication/Computer Use: independent - off of work so not having to do much  Community Mobility: independent - driving but limited  Care of Others:- independent  Sleep: not able to get to sleep, \"not getting enough sleep\" - normal is almost 9-10 hrs, now getting around 6 hrs.     Assessment & Plan   CLINICAL IMPRESSIONS  Medical Diagnosis: Concussion with unknown loss of consciousness status, initial encounter (S06.0XAA)    Treatment Diagnosis: Concussion with unknown loss of consciousness status, initial encounter (S06.0XAA), alteration in instrumental activities of daily living    Impression/Assessment: Pt is a 29 year old female presenting to Occupational Therapy due to concussion mgmt, alteration in IADLs.  The following significant findings have been identified: Impaired activity tolerance and Anxiety and/or fear.  These identified deficits interfere with their ability to perform self care tasks, work tasks, recreational activities, and driving  as compared to previous level of function.     Clinical Decision Making (Complexity):  Assessment of Occupational Performance: 3-5 Performance Deficits  Occupational Performance Limitations: driving and community mobility, sleep, work, leisure activities, and social participation  Clinical Decision Making (Complexity): Moderate complexity    PLAN OF CARE  Treatment Interventions:  Interventions: Self-Care/Home Management, Therapeutic Activity, Therapeutic Exercise    Long Term Goals   OT Goal 1  Goal Identifier: concussion symptoms  Goal " Description: Pt to verbalize understanding of 3-5 concussion symptom management techniques in order to increase safety and independence with ADL/IADL tasks.  Target Date: 09/22/24  OT Goal 2  Goal Identifier: sleep  Goal Description: Pt will identify 3-5 sleep hygiene techniques in order to further manage fatigue for increased safety and independence with ADL/IADL tasks.  Target Date: 09/22/24  OT Goal 3  Goal Identifier: visual HEP  Goal Description: Pt to participate with and demonstrate understanding of visual HEP and adaptations in order to increase safety and independence with ADL/IADL tasks.  Target Date: 09/22/24  OT Goal 4  Goal Identifier: A-DEM  Goal Description: Pt to increase performance to within age norms for visual performance with completion of A-DEM, in order to increase independence with work related reading tasks and increase safety with IADL tasks.  Target Date: 09/22/24      Frequency of Treatment: one time per week  Duration of Treatment: up to 90 days     Recommended Referrals to Other Professionals:  to be assesssed  Education Assessment: Learner/Method: Patient;Listening     Risks and benefits of evaluation/treatment have been explained.   Patient/Family/caregiver agrees with Plan of Care.     Evaluation Time:    OT Eval, Moderate Complexity Minutes (01609): 30    Signing Clinician: Oneil Lindo OT

## 2024-06-26 ENCOUNTER — THERAPY VISIT (OUTPATIENT)
Dept: PHYSICAL THERAPY | Facility: CLINIC | Age: 29
End: 2024-06-26
Attending: PHYSICAL MEDICINE & REHABILITATION
Payer: OTHER MISCELLANEOUS

## 2024-06-26 DIAGNOSIS — S06.0XAA CONCUSSION WITH UNKNOWN LOSS OF CONSCIOUSNESS STATUS, INITIAL ENCOUNTER: ICD-10-CM

## 2024-06-26 PROCEDURE — 97112 NEUROMUSCULAR REEDUCATION: CPT | Mod: GP | Performed by: PHYSICAL THERAPIST

## 2024-06-26 PROCEDURE — 97161 PT EVAL LOW COMPLEX 20 MIN: CPT | Mod: GP | Performed by: PHYSICAL THERAPIST

## 2024-06-26 NOTE — PROGRESS NOTES
"PHYSICAL THERAPY EVALUATION  Type of Visit: Evaluation       Fall Risk Screen:  Fall screen completed by: PT  Have you fallen 2 or more times in the past year?: No  Have you fallen and had an injury in the past year?: No  Is patient a fall risk?: No    Subjective       Presenting condition or subjective complaint: concussion  Date of onset: 06/02/24    Relevant medical history:   \"Per ED notes 6/2/24: Pelon Guevara is a 29 year old female employee of [employer] who was at her locker yesterday morning when someone had the locker door above her open.  She was bending over into her locker and stood up and hit the left parietal aspect of her scalp against the open locker door above her locker.  The patient had no immediate loss of consciousness but did have some dizziness, near syncope, and nauseousness.  The patient went home and when getting home fell asleep immediately only to wake up a few hours later with a significant left-sided headache.  The patient states that she has also developed some numbness in her left shoulder area.  The patient has had persistent nausea since the incident. No vomiting, no diarrhea, and complains of some slight neck pain as well.  The patient feels slightly confused.\"   Dates & types of surgery:      Prior diagnostic imaging/testing results: CT scan     Prior therapy history for the same diagnosis, illness or injury:        Prior Level of Function  Transfers: Independent  Ambulation: Independent  ADL: Independent  IADL:     Living Environment  Social support: Alone   Type of home: Apartment/condo   Stairs to enter the home:         Ramp: No   Stairs inside the home: Yes 12 Is there a railing: No     Help at home: None  Equipment owned:       Employment: Yes sterile processing  Hobbies/Interests: hiking gymming running cooking listening to music    Patient goals for therapy: be less sad    Pain assessment:  Pt notes neck is sore, but full ROM.     Objective   Vitals " Signs  Heart Rate: 84  SpO2: 97  Blood Pressure: 118/75  Vital Signs Comments: sitting resting  Cognitive Status Examination  Orientation: Oriented to person, place and time   Level of Consciousness: Alert  Follows Commands and Answers Questions: 100% of the time, Follows multi step instructions  Personal Safety and Judgement: Intact  Memory: Intact    OBSERVATION: Engaged in session  INTEGUMENTARY:   POSTURE:   PALPATION: Moderate fibrotic tone in B upper traps  RANGE OF MOTION: Full neck ROM, symmetrical. Generally feeling stiff in neck.  STRENGTH:     BED MOBILITY: Independent    TRANSFERS: Independent    WHEELCHAIR MOBILITY: NA    GAIT:   Level of [place]: Independent  Assistive Device(s): None  Gait Deviations: WNL  Gait Distance: 100' - submaximal  Stairs: NT    BALANCE:     SPECIAL TESTS  10 Meter Walk Test (Comfortable)  1.2m/sec     SENSATION:     REFLEXES:   COORDINATION:   MUSCLE TONE:        VESTIBULAR EVALUATION  ADDITIONAL HISTORY:  Description of symptoms:  Mildly lightheaded, off balance with sit/stand, sup/stand. This has been better lately. No spinning dizziness.     Pertinent visual history:   Pertinent history of current vestibular problem:   DHI:  Pt did not complete today  CSA: Pt did not complete today    Cervicogenic Screen    Neck ROM Normal   Alar Ligament Test Normal   Transverse Ligament Test Normal     Oculomotor Screen    See separate note for VOMS         Assessment & Plan   CLINICAL IMPRESSIONS  Medical Diagnosis: Concussion with unknown loss of consciousness status, initial encounter (S06.0XAA)  - Primary    Treatment Diagnosis: Sensory selection deficit   Impression/Assessment: Patient is a 29 year old female with decreased activity tolerance, mild imbalance complaints.  The following significant findings have been identified: Pain, Decreased ROM/flexibility, and Decreased activity tolerance. These impairments interfere with their ability to perform work tasks and recreational  activities as compared to previous level of function.     Clinical Decision Making (Complexity):  Clinical Presentation: Evolving/Changing  Clinical Presentation Rationale: based on medical and personal factors listed in PT evaluation  Clinical Decision Making (Complexity): Low complexity    PLAN OF CARE  Treatment Interventions:  Modalities: Cryotherapy  Interventions: Manual Therapy, Neuromuscular Re-education, Therapeutic Activity, Therapeutic Exercise, Self-Care/Home Management    Long Term Goals     PT Goal 1  Goal Identifier: Running  Goal Description: Pt report ability to Run/walk 3x per week minimum 3miles without increase in concussion sx to return to usual exercise routine.  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  Target Date: 09/23/24  PT Goal 3  Goal Identifier: Weight lifting  Goal Description: Pt report ability to return to usual weight lifting routine at least 2x per week without increase in concussion sx.  Target Date: 09/23/24  PT Goal 4  Goal Identifier: VOMS  Goal Description: Pt complete entire VOMS without more than 3point increase in concussion sx as result of improved vestibular habituation.  Target Date: 09/23/24      Frequency of Treatment: 1x per 1-2 weeks  Duration of Treatment: 90 days    Recommended Referrals to Other Professionals: Occupational Therapy, Mental health  Education Assessment:   Learner/Method: Patient  Education Comments: HEP    Risks and benefits of evaluation/treatment have been explained.   Patient/Family/caregiver agrees with Plan of Care.     Evaluation Time: 35 Low Complexity        Signing Clinician: Destiney Pinon, PT

## 2024-06-26 NOTE — PROGRESS NOTES
Vestibular/Ocular Motor Test:     Not Tested Headache Dizziness Nausea Fogginess Comments   Baseline N/A 5 0 0 6 Glasses at night only for driving   Smooth Pursuits  5 0 0 6 Eyes hurt, some saccades with vertical   Saccades-Horizontal  5 0 0 6 L eye strain, colors changed, feels like head is getting heavier   Saccades-Vertical  5 0 7 6 Hot, no eye strain   Convergence ([place])  5 0 0 6 ([place] in CM)  Measure 1: 8  Measure 2: 10  Measure 3: 8     VOR Horizontal  5 0 0 6 Target went double at rep 8   VOR Vertical  5 0 0 6 Harder to coordinate, but less headache, not double   Visual Motion Sensitivity Test  5 7 0 6 No other sx

## 2024-07-01 ENCOUNTER — THERAPY VISIT (OUTPATIENT)
Dept: OCCUPATIONAL THERAPY | Facility: CLINIC | Age: 29
End: 2024-07-01
Attending: PHYSICAL MEDICINE & REHABILITATION
Payer: OTHER MISCELLANEOUS

## 2024-07-01 ENCOUNTER — THERAPY VISIT (OUTPATIENT)
Dept: PHYSICAL THERAPY | Facility: CLINIC | Age: 29
End: 2024-07-01
Attending: PHYSICAL MEDICINE & REHABILITATION
Payer: OTHER MISCELLANEOUS

## 2024-07-01 DIAGNOSIS — S06.0XAA CONCUSSION WITH UNKNOWN LOSS OF CONSCIOUSNESS STATUS, INITIAL ENCOUNTER: Primary | ICD-10-CM

## 2024-07-01 DIAGNOSIS — Z78.9 ALTERATION IN INSTRUMENTAL ACTIVITIES OF DAILY LIVING (IADL): ICD-10-CM

## 2024-07-01 PROCEDURE — 97530 THERAPEUTIC ACTIVITIES: CPT | Mod: GO | Performed by: OCCUPATIONAL THERAPIST

## 2024-07-01 PROCEDURE — 97112 NEUROMUSCULAR REEDUCATION: CPT | Mod: GP | Performed by: PHYSICAL THERAPIST

## 2024-07-01 PROCEDURE — 97110 THERAPEUTIC EXERCISES: CPT | Mod: GP | Performed by: PHYSICAL THERAPIST

## 2024-07-01 NOTE — PATIENT INSTRUCTIONS
You should be exercising for at least 20 mins ideally 5-6x per week with a heart rate of 136-153.       EXPOSE AND RECOVER!

## 2024-07-01 NOTE — PROGRESS NOTES
[place] Concussion Treadmill Test (BCTT) Test Results Form:    Min  HR RPE Concussion Symptoms (Likert Scale) Speed/Incline Observations            Rest  88 0 Headache 0/10, Head heavy 6/10 0/0 /81   1  122 1 Head heavy 6/10 3.2mph/0    2  127 1 Head heavy 6/10 3.2/1    3  134 1 Head heavy 6/10 3.2/2    4  144 2 Head heavy 6/10 3.2/3    5  148 3 Head heavy 6/10 3.2/4    6  156 4 Head heavy 6/10 3.2/5    7  170 6 Head heavy 6/10, piercing 4/10 3.2/6    8  174 6 Head heavy 6/10, piercing 4/10 3.2/7    9  156 4 Head heavy 6/10, piercing 5/10 3.2/0    10  135 3 Head heavy 6/10, piercing 2/10 2.8/0 End test /81   11         12         13         14         15         16         17         18         19         20

## 2024-07-08 ENCOUNTER — THERAPY VISIT (OUTPATIENT)
Dept: OCCUPATIONAL THERAPY | Facility: CLINIC | Age: 29
End: 2024-07-08
Attending: PHYSICAL MEDICINE & REHABILITATION
Payer: OTHER MISCELLANEOUS

## 2024-07-08 DIAGNOSIS — S06.0XAA CONCUSSION WITH UNKNOWN LOSS OF CONSCIOUSNESS STATUS, INITIAL ENCOUNTER: Primary | ICD-10-CM

## 2024-07-08 DIAGNOSIS — Z78.9 ALTERATION IN INSTRUMENTAL ACTIVITIES OF DAILY LIVING (IADL): ICD-10-CM

## 2024-07-08 PROCEDURE — 97530 THERAPEUTIC ACTIVITIES: CPT | Mod: GO | Performed by: OCCUPATIONAL THERAPIST

## 2024-07-17 ENCOUNTER — TELEPHONE (OUTPATIENT)
Dept: PHYSICAL THERAPY | Facility: CLINIC | Age: 29
End: 2024-07-17

## 2024-07-22 ENCOUNTER — THERAPY VISIT (OUTPATIENT)
Dept: OCCUPATIONAL THERAPY | Facility: CLINIC | Age: 29
End: 2024-07-22
Attending: PHYSICAL MEDICINE & REHABILITATION
Payer: OTHER MISCELLANEOUS

## 2024-07-22 DIAGNOSIS — S06.0XAA CONCUSSION WITH UNKNOWN LOSS OF CONSCIOUSNESS STATUS, INITIAL ENCOUNTER: Primary | ICD-10-CM

## 2024-07-22 DIAGNOSIS — Z78.9 ALTERATION IN INSTRUMENTAL ACTIVITIES OF DAILY LIVING (IADL): ICD-10-CM

## 2024-07-22 PROCEDURE — 97530 THERAPEUTIC ACTIVITIES: CPT | Mod: GO | Performed by: OCCUPATIONAL THERAPIST

## 2024-07-22 PROCEDURE — 97535 SELF CARE MNGMENT TRAINING: CPT | Mod: GO | Performed by: OCCUPATIONAL THERAPIST

## 2024-07-23 ENCOUNTER — TELEPHONE (OUTPATIENT)
Dept: PHYSICAL THERAPY | Facility: CLINIC | Age: 29
End: 2024-07-23

## 2024-11-02 ENCOUNTER — HOSPITAL ENCOUNTER (EMERGENCY)
Facility: CLINIC | Age: 29
Discharge: HOME | End: 2024-11-02
Attending: FAMILY MEDICINE | Admitting: FAMILY MEDICINE

## 2024-11-02 VITALS
HEART RATE: 77 BPM | HEIGHT: 69 IN | DIASTOLIC BLOOD PRESSURE: 71 MMHG | WEIGHT: 170 LBS | SYSTOLIC BLOOD PRESSURE: 121 MMHG | BODY MASS INDEX: 25.18 KG/M2 | RESPIRATION RATE: 16 BRPM | OXYGEN SATURATION: 97 % | TEMPERATURE: 97.7 F

## 2024-11-02 DIAGNOSIS — K60.2 ANAL FISSURE: ICD-10-CM

## 2024-11-02 LAB
ABO/RH(D): NORMAL
ALBUMIN SERPL BCG-MCNC: 4.4 G/DL (ref 3.5–5.2)
ALP SERPL-CCNC: 55 U/L (ref 40–150)
ALT SERPL W P-5'-P-CCNC: 26 U/L (ref 0–50)
ANION GAP SERPL CALCULATED.3IONS-SCNC: 11 MMOL/L (ref 7–15)
ANTIBODY SCREEN: NEGATIVE
APTT PPP: 33 SECONDS (ref 22–38)
AST SERPL W P-5'-P-CCNC: 25 U/L (ref 0–45)
BASOPHILS # BLD AUTO: 0 10E3/UL (ref 0–0.2)
BASOPHILS NFR BLD AUTO: 0 %
BILIRUB SERPL-MCNC: 0.4 MG/DL
BUN SERPL-MCNC: 17.7 MG/DL (ref 6–20)
CALCIUM SERPL-MCNC: 9 MG/DL (ref 8.8–10.4)
CHLORIDE SERPL-SCNC: 101 MMOL/L (ref 98–107)
CREAT SERPL-MCNC: 0.71 MG/DL (ref 0.51–0.95)
EGFRCR SERPLBLD CKD-EPI 2021: >90 ML/MIN/1.73M2
EOSINOPHIL # BLD AUTO: 0.1 10E3/UL (ref 0–0.7)
EOSINOPHIL NFR BLD AUTO: 1 %
ERYTHROCYTE [DISTWIDTH] IN BLOOD BY AUTOMATED COUNT: 12 % (ref 10–15)
GLUCOSE SERPL-MCNC: 91 MG/DL (ref 70–99)
HCG UR QL: NEGATIVE
HCO3 SERPL-SCNC: 25 MMOL/L (ref 22–29)
HCT VFR BLD AUTO: 38.5 % (ref 35–47)
HGB BLD-MCNC: 12.8 G/DL (ref 11.7–15.7)
IMM GRANULOCYTES # BLD: 0 10E3/UL
IMM GRANULOCYTES NFR BLD: 0 %
INR PPP: 0.94 (ref 0.85–1.15)
INTERNAL QC OK POCT: NORMAL
LYMPHOCYTES # BLD AUTO: 1.9 10E3/UL (ref 0.8–5.3)
LYMPHOCYTES NFR BLD AUTO: 22 %
MCH RBC QN AUTO: 29.5 PG (ref 26.5–33)
MCHC RBC AUTO-ENTMCNC: 33.2 G/DL (ref 31.5–36.5)
MCV RBC AUTO: 89 FL (ref 78–100)
MONOCYTES # BLD AUTO: 0.4 10E3/UL (ref 0–1.3)
MONOCYTES NFR BLD AUTO: 5 %
NEUTROPHILS # BLD AUTO: 6.2 10E3/UL (ref 1.6–8.3)
NEUTROPHILS NFR BLD AUTO: 71 %
NRBC # BLD AUTO: 0 10E3/UL
NRBC BLD AUTO-RTO: 0 /100
PLATELET # BLD AUTO: 267 10E3/UL (ref 150–450)
POCT KIT EXPIRATION DATE: NORMAL
POCT KIT LOT NUMBER: NORMAL
POTASSIUM SERPL-SCNC: 3.6 MMOL/L (ref 3.4–5.3)
PROT SERPL-MCNC: 7.4 G/DL (ref 6.4–8.3)
RBC # BLD AUTO: 4.34 10E6/UL (ref 3.8–5.2)
SODIUM SERPL-SCNC: 137 MMOL/L (ref 135–145)
SPECIMEN EXPIRATION DATE: NORMAL
WBC # BLD AUTO: 8.7 10E3/UL (ref 4–11)

## 2024-11-02 PROCEDURE — 85610 PROTHROMBIN TIME: CPT | Performed by: FAMILY MEDICINE

## 2024-11-02 PROCEDURE — 82040 ASSAY OF SERUM ALBUMIN: CPT | Performed by: FAMILY MEDICINE

## 2024-11-02 PROCEDURE — 86901 BLOOD TYPING SEROLOGIC RH(D): CPT | Performed by: FAMILY MEDICINE

## 2024-11-02 PROCEDURE — 99283 EMERGENCY DEPT VISIT LOW MDM: CPT | Performed by: FAMILY MEDICINE

## 2024-11-02 PROCEDURE — 36415 COLL VENOUS BLD VENIPUNCTURE: CPT | Performed by: FAMILY MEDICINE

## 2024-11-02 PROCEDURE — 85730 THROMBOPLASTIN TIME PARTIAL: CPT | Performed by: FAMILY MEDICINE

## 2024-11-02 PROCEDURE — 85025 COMPLETE CBC W/AUTO DIFF WBC: CPT | Performed by: FAMILY MEDICINE

## 2024-11-02 PROCEDURE — 99284 EMERGENCY DEPT VISIT MOD MDM: CPT | Performed by: FAMILY MEDICINE

## 2024-11-02 PROCEDURE — 81025 URINE PREGNANCY TEST: CPT | Performed by: FAMILY MEDICINE

## 2024-11-02 RX ORDER — HYDROCORTISONE 25 MG/G
CREAM TOPICAL 2 TIMES DAILY PRN
Qty: 30 G | Refills: 0 | Status: SHIPPED | OUTPATIENT
Start: 2024-11-02

## 2024-11-02 ASSESSMENT — ACTIVITIES OF DAILY LIVING (ADL)
ADLS_ACUITY_SCORE: 0

## 2024-11-02 NOTE — ED PROVIDER NOTES
"    [facility] EMERGENCY DEPARTMENT ([facility])    11/02/24      ED PROVIDER NOTE       History     Chief Complaint   Patient presents with    Rectal Bleeding     Noticed initially a few months ago a few times with only a little rectal bleeding. This morning started having a lot more blood when pooping. Unsure if it is in stool, but it is in the toilet. States it is gushing out at times.      HPI  Pelon Guevara is a 29 year old female who presents to the ED with rectal bleeding.     Patient reports initially noticing some rectal bleeding a few times over the past few months. This morning she reports having a bowel movement with \"gushes\" of bright red blood per rectum. Patient states this was very painful, which prompted her to come to the ED. She also reports lightheadedness, sweating, and cold hands.     Past Medical History  History reviewed. No pertinent past medical history.  History reviewed. No pertinent surgical history.  hydrocortisone, Perianal, (HYDROCORTISONE) 2.5 % cream  ondansetron (ZOFRAN) 8 MG tablet      Allergies   Allergen Reactions    Acetaminophen      Other Reaction(s): Throat Irritation, Throat Swelling/Closing    Naproxen Other (See Comments)     Other Reaction(s): Throat Irritation    Able to take ibuprofen without issues    Able to tolerate ibuprofen.     Family History  History reviewed. No pertinent family history.  Social History   Social History     Tobacco Use    Smoking status: Never    Smokeless tobacco: Never   Substance Use Topics    Alcohol use: Yes    Drug use: Never      Past medical history, past surgical history, medications, allergies, family history, and social history were reviewed with the patient. No additional pertinent items.     A medically appropriate review of systems was performed with pertinent positives and negatives noted in the HPI, and all other systems negative.    Physical Exam   BP: 116/82  Pulse: 84  Temp: 97.7  F (36.5  C)  Resp: " "16  Height: 175.3 cm (5' 9\")  Weight: 77.1 kg (170 lb)  SpO2: 99 %  Physical Exam  Vitals and nursing note reviewed.   Constitutional:       General: She is not in acute distress.     Appearance: Normal appearance. She is not diaphoretic.   HENT:      Head: Atraumatic.      Mouth/Throat:      Mouth: Mucous membranes are moist.   Eyes:      General: No scleral icterus.     Conjunctiva/sclera: Conjunctivae normal.   Cardiovascular:      Rate and Rhythm: Normal rate.      Heart sounds: Normal heart sounds.   Pulmonary:      Effort: No respiratory distress.      Breath sounds: Normal breath sounds.   Abdominal:      General: Abdomen is flat.   Genitourinary:     Rectum: Tenderness and anal fissure present.      Comments: Several anal fissures are noted anoscopic examination was not performed.  Musculoskeletal:      Cervical back: Neck supple.   Skin:     General: Skin is warm.      Findings: No rash.   Neurological:      General: No focal deficit present.      Mental Status: She is alert and oriented to person, place, and time.      Sensory: No sensory deficit.      Motor: No weakness.      Coordination: Coordination normal.           ED Course, Procedures, & Data      Procedures       Results for orders placed or performed during the hospital encounter of 11/02/24   Comprehensive metabolic panel     Status: Normal   Result Value Ref Range    Sodium 137 135 - 145 mmol/L    Potassium 3.6 3.4 - 5.3 mmol/L    Carbon Dioxide (CO2) 25 22 - 29 mmol/L    Anion Gap 11 7 - 15 mmol/L    Urea Nitrogen 17.7 6.0 - 20.0 mg/dL    Creatinine 0.71 0.51 - 0.95 mg/dL    GFR Estimate >90 >60 mL/min/1.73m2    Calcium 9.0 8.8 - 10.4 mg/dL    Chloride 101 98 - 107 mmol/L    Glucose 91 70 - 99 mg/dL    Alkaline Phosphatase 55 40 - 150 U/L    AST 25 0 - 45 U/L    ALT 26 0 - 50 U/L    Protein Total 7.4 6.4 - 8.3 g/dL    Albumin 4.4 3.5 - 5.2 g/dL    Bilirubin Total 0.4 <=1.2 mg/dL   INR     Status: Normal   Result Value Ref Range    INR 0.94 " 0.85 - 1.15   Partial thromboplastin time     Status: Normal   Result Value Ref Range    aPTT 33 22 - 38 Seconds   CBC with platelets and differential     Status: None   Result Value Ref Range    WBC Count 8.7 4.0 - 11.0 10e3/uL    RBC Count 4.34 3.80 - 5.20 10e6/uL    Hemoglobin 12.8 11.7 - 15.7 g/dL    Hematocrit 38.5 35.0 - 47.0 %    MCV 89 78 - 100 fL    MCH 29.5 26.5 - 33.0 pg    MCHC 33.2 31.5 - 36.5 g/dL    RDW 12.0 10.0 - 15.0 %    Platelet Count 267 150 - 450 10e3/uL    % Neutrophils 71 %    % Lymphocytes 22 %    % Monocytes 5 %    % Eosinophils 1 %    % Basophils 0 %    % Immature Granulocytes 0 %    NRBCs per 100 WBC 0 <1 /100    Absolute Neutrophils 6.2 1.6 - 8.3 10e3/uL    Absolute Lymphocytes 1.9 0.8 - 5.3 10e3/uL    Absolute Monocytes 0.4 0.0 - 1.3 10e3/uL    Absolute Eosinophils 0.1 0.0 - 0.7 10e3/uL    Absolute Basophils 0.0 0.0 - 0.2 10e3/uL    Absolute Immature Granulocytes 0.0 <=0.4 10e3/uL    Absolute NRBCs 0.0 10e3/uL   hCG qual urine POCT     Status: Normal   Result Value Ref Range    HCG Qual Urine Negative Negative    Internal QC Check POCT Valid Valid    POCT Kit Lot Number [id number]     POCT Kit Expiration Date 03/26/2026    Adult Type and Screen     Status: None   Result Value Ref Range    ABO/RH(D) O POS     Antibody Screen Negative Negative    SPECIMEN EXPIRATION DATE [id number]    CBC with platelets differential     Status: None    Narrative    The following orders were created for panel order CBC with platelets differential.  Procedure                               Abnormality         Status                     ---------                               -----------         ------                     CBC with platelets and d...[[id number]]                      Final result                 Please view results for these tests on the individual orders.   ABO/Rh type and screen     Status: None    Narrative    The following orders were created for panel order ABO/Rh type and  screen.  Procedure                               Abnormality         Status                     ---------                               -----------         ------                     Adult Type and Screen[[id number]]                            Final result                 Please view results for these tests on the individual orders.     Medications - No data to display  Labs Ordered and Resulted from Time of ED Arrival to Time of ED Departure   COMPREHENSIVE METABOLIC PANEL - Normal       Result Value    Sodium 137      Potassium 3.6      Carbon Dioxide (CO2) 25      Anion Gap 11      Urea Nitrogen 17.7      Creatinine 0.71      GFR Estimate >90      Calcium 9.0      Chloride 101      Glucose 91      Alkaline Phosphatase 55      AST 25      ALT 26      Protein Total 7.4      Albumin 4.4      Bilirubin Total 0.4     INR - Normal    INR 0.94     PARTIAL THROMBOPLASTIN TIME - Normal    aPTT 33     HCG QUALITATIVE URINE POCT - Normal    HCG Qual Urine Negative      Internal QC Check POCT Valid      POCT Kit Lot Number [id number]      POCT Kit Expiration Date 03/26/2026     CBC WITH PLATELETS AND DIFFERENTIAL    WBC Count 8.7      RBC Count 4.34      Hemoglobin 12.8      Hematocrit 38.5      MCV 89      MCH 29.5      MCHC 33.2      RDW 12.0      Platelet Count 267      % Neutrophils 71      % Lymphocytes 22      % Monocytes 5      % Eosinophils 1      % Basophils 0      % Immature Granulocytes 0      NRBCs per 100 WBC 0      Absolute Neutrophils 6.2      Absolute Lymphocytes 1.9      Absolute Monocytes 0.4      Absolute Eosinophils 0.1      Absolute Basophils 0.0      Absolute Immature Granulocytes 0.0      Absolute NRBCs 0.0     TYPE AND SCREEN, ADULT    ABO/RH(D) O POS      Antibody Screen Negative      SPECIMEN EXPIRATION DATE [id number]     ABO/RH TYPE AND SCREEN     No orders to display          Critical care was not performed.     Medical Decision Making  The patient's presentation was of moderate complexity (an  acute illness with systemic symptoms).    The patient's evaluation involved:  ordering and/or review of 3+ test(s) in this encounter (see separate area of note for details)  discussion of management or test interpretation with another health professional (patient was discussed with colon rectal surgery and they will follow-up in clinic.)    The patient's management necessitated moderate risk (prescription drug management including medications given in the ED).    Assessment & Plan        I have reviewed the nursing notes. I have reviewed the findings, diagnosis, plan and need for follow up with the patient.    Discharge Medication List as of 11/2/2024  9:00 PM        START taking these medications    Details   hydrocortisone, Perianal, (HYDROCORTISONE) 2.5 % cream Place rectally 2 times daily as needed for hemorrhoids.Disp-30 g, [id number]             Final diagnoses:   Anal fissure   IJahaira, am serving as a trained medical scribe to document services personally performed by Howard Gooden MD based on the provider's statements to me on November 2, 2024.  This document has been checked and approved by the attending provider.    I, Howard Gooden MD, was physically present and have reviewed and verified the accuracy of this note documented by Jahaira Enrique, medical scribe.      Howard Gooden MD   [facility] EMERGENCY DEPARTMENT  11/2/2024     Howard Gooden MD  11/03/24 0115

## 2024-11-02 NOTE — ED TRIAGE NOTES
Noticed initially a few months ago a few times with only a little rectal bleeding. This morning started having a lot more blood when pooping. Unsure if it is in stool, but it is in the toilet. States it is gushing out at times. Intermittent palpitations. Dizziness/lightheadedness.      Triage Assessment (Adult)       Row Name 11/02/24 0288          Triage Assessment    Airway WDL WDL        Respiratory WDL    Respiratory WDL WDL        Skin Circulation/Temperature WDL    Skin Circulation/Temperature WDL WDL        Cardiac WDL    Cardiac WDL WDL        Peripheral/Neurovascular WDL    Peripheral Neurovascular WDL WDL        Cognitive/Neuro/Behavioral WDL    Cognitive/Neuro/Behavioral WDL WDL

## 2024-11-03 NOTE — DISCHARGE INSTRUCTIONS
Discharge to home use fiber such as Metamucil to make sure that stools are soft May use Anusol HC to decrease inflammation and plan on close follow-up with colorectal surgery this week.  Return if marked increase in bleeding.

## 2024-11-04 ENCOUNTER — PATIENT OUTREACH (OUTPATIENT)
Dept: NURSING | Facility: CLINIC | Age: 29
End: 2024-11-04

## 2024-11-04 NOTE — TELEPHONE ENCOUNTER
Samantha Marshall RN called [facility] on 11/4 and left a message to return the call to the clinic for hospital/emergency room follow up after discharge on 11/2.    If patient calls back, please route to [facility].    TC please route to RN.    ELLIE Roth RN  [facility]    Notes for RN in case of return call from patient or second attempt call:  Discharged on 11/2 from [facility].  ER or Hospital? ER  Diagnosis for visit: Hemmorhoids  Other information if necessary: Medication were encouraged.  Make sure she is using these.     Delete this note after TCM call documentation is completed.

## 2024-11-05 NOTE — TELEPHONE ENCOUNTER
Jeannie Pritchett RN called patient on 11/5 and left a message to return call to the clinic for hospital/ED follow-up.    When patient returns call, please route to Kobe Barrera RN.    Attempt 2/2. Closing encounter.      Jeannie Pritchett RN  [facility]

## 2025-01-03 NOTE — ED NOTES
Bed: ED18  Expected date:   Expected time:   Means of arrival:   Comments:  Triage- A.M.  
PIV, labs, and consult completed during pt' ED stay. After discharge teaching complete, all of pt's questions answered. RR WNL, even, and unlabored.  
HEADACHE

## 2025-05-17 ENCOUNTER — HEALTH MAINTENANCE LETTER (OUTPATIENT)
Age: 30
End: 2025-05-17